# Patient Record
Sex: FEMALE | Race: WHITE | NOT HISPANIC OR LATINO | URBAN - METROPOLITAN AREA
[De-identification: names, ages, dates, MRNs, and addresses within clinical notes are randomized per-mention and may not be internally consistent; named-entity substitution may affect disease eponyms.]

---

## 2020-01-28 PROBLEM — 428283002 HISTORY OF POLYP OF COLON (SITUATION): Status: ACTIVE | Noted: 2020-01-28

## 2020-07-22 ENCOUNTER — TELEPHONE ENCOUNTER (OUTPATIENT)
Dept: URBAN - METROPOLITAN AREA CLINIC 35 | Facility: CLINIC | Age: 65
End: 2020-07-22

## 2022-04-26 ENCOUNTER — OFFICE VISIT (OUTPATIENT)
Dept: URBAN - METROPOLITAN AREA CLINIC 33 | Facility: CLINIC | Age: 67
End: 2022-04-26
Payer: MEDICARE

## 2022-04-26 ENCOUNTER — TELEPHONE ENCOUNTER (OUTPATIENT)
Dept: URBAN - METROPOLITAN AREA CLINIC 35 | Facility: CLINIC | Age: 67
End: 2022-04-26

## 2022-04-26 VITALS
WEIGHT: 134.8 LBS | HEIGHT: 63 IN | SYSTOLIC BLOOD PRESSURE: 118 MMHG | OXYGEN SATURATION: 96 % | BODY MASS INDEX: 23.88 KG/M2 | HEART RATE: 97 BPM | DIASTOLIC BLOOD PRESSURE: 76 MMHG

## 2022-04-26 DIAGNOSIS — Z80.0 FAMILY HISTORY OF COLON CANCER: ICD-10-CM

## 2022-04-26 DIAGNOSIS — R14.3 FLATULENCE SYMPTOM: ICD-10-CM

## 2022-04-26 DIAGNOSIS — Z12.11 COLON CANCER SCREENING: ICD-10-CM

## 2022-04-26 PROCEDURE — 99214 OFFICE O/P EST MOD 30 MIN: CPT | Performed by: PHYSICIAN ASSISTANT

## 2022-04-26 RX ORDER — TRAZODONE HYDROCHLORIDE 100 MG/1
1 TABLET AT BEDTIME TABLET, FILM COATED ORAL ONCE A DAY
Qty: 30 | Status: ON HOLD | COMMUNITY

## 2022-04-26 RX ORDER — B-COMPLEX WITH VITAMIN C
AS DIRECTED TABLET ORAL
Status: ON HOLD | COMMUNITY

## 2022-04-26 RX ORDER — CALCIUM CITRATE/VITAMIN D3 315MG-6.25
1 TABLET TABLET ORAL TWICE A DAY
Qty: 60 | Status: ON HOLD | COMMUNITY

## 2022-04-26 RX ORDER — BRIVARACETAM 100 MG/1
1 TABLET TABLET, FILM COATED ORAL TWICE A DAY
Status: ACTIVE | COMMUNITY

## 2022-04-26 RX ORDER — CALCIUM CARBONATE 500(1250)
1 TABLET WITH MEALS TABLET ORAL TWICE A DAY
Status: ACTIVE | COMMUNITY

## 2022-04-26 RX ORDER — SODIUM PICOSULFATE, MAGNESIUM OXIDE, AND ANHYDROUS CITRIC ACID 10; 3.5; 12 MG/160ML; G/160ML; G/160ML
AS DIRECTED LIQUID ORAL
Qty: 1 KIT | Refills: 0 | Status: ON HOLD | COMMUNITY
Start: 2020-01-28

## 2022-04-26 RX ORDER — LETROZOLE 2.5 MG/1
1 TABLET TABLET, FILM COATED ORAL ONCE A DAY
Status: ACTIVE | COMMUNITY

## 2022-04-26 RX ORDER — LEVOTHYROXINE SODIUM 50 UG/1
1 TABLET IN THE MORNING ON AN EMPTY STOMACH TABLET ORAL ONCE A DAY
Status: ACTIVE | COMMUNITY

## 2022-04-26 RX ORDER — SODIUM PICOSULFATE, MAGNESIUM OXIDE, AND ANHYDROUS CITRIC ACID 10; 3.5; 12 MG/160ML; G/160ML; G/160ML
160 ML LIQUID ORAL
Qty: 320 MILLILITER | Refills: 0 | OUTPATIENT
Start: 2022-04-26 | End: 2022-04-27

## 2022-04-26 NOTE — PHYSICAL EXAM GASTROINTESTINAL
Abdomen , soft, nontender, nondistended , no guarding or rigidity , no masses palpable , normal bowel sounds , Liver and Spleen , no hepatomegaly present , no hepatosplenomegaly , liver nontender , spleen not palpable
18-Jan-2022

## 2022-04-26 NOTE — HPI-COLONOSCOPY SCREENING
Patient is a 66-year-old  female who presents today for consultation for a colonoscopy.  Her last colonoscopy was in 2014 with Dr. Santos. Maternal uncle was diagnosed with colon cancer in his 60's and passed away in his 80's.   Patient currently admits one bowel movement every day. Stools are more hard and formed.   Denies sleep apnea or any cardiac issues. Admits occasional NSAID use.  COLONOSCOPY 05/28/2014 - DR. Santos Rectum: A few polyps of size 3.0 mm. Excision biopsies taken. Bx revealed hyperplastic polyp x 2 (or fragments of the same) Transverse colon: polyp of size 3.0 mm - distal. Polypectomy done using cold biopsy forceps. Bx revealed benign colonic mucosa with histological features highly suggestive of, but no diagnostic for, a hyperplastic polyp. ;

## 2022-04-26 NOTE — HPI-GAS
She also admits having a lot of flatulence. She said it's a running joke in the family, and has been a longstanding issue.

## 2022-06-10 PROBLEM — 305058001: Status: ACTIVE | Noted: 2022-06-10

## 2022-07-11 ENCOUNTER — CLAIMS CREATED FROM THE CLAIM WINDOW (OUTPATIENT)
Dept: URBAN - METROPOLITAN AREA CLINIC 4 | Facility: CLINIC | Age: 67
End: 2022-07-11
Payer: MEDICARE

## 2022-07-11 ENCOUNTER — OFFICE VISIT (OUTPATIENT)
Dept: URBAN - METROPOLITAN AREA SURGERY CENTER 8 | Facility: SURGERY CENTER | Age: 67
End: 2022-07-11
Payer: MEDICARE

## 2022-07-11 DIAGNOSIS — D12.0 ADENOMA OF CECUM: ICD-10-CM

## 2022-07-11 DIAGNOSIS — D12.0 BENIGN NEOPLASM OF CECUM: ICD-10-CM

## 2022-07-11 DIAGNOSIS — Z12.11 COLON CANCER SCREENING: ICD-10-CM

## 2022-07-11 PROCEDURE — 45385 COLONOSCOPY W/LESION REMOVAL: CPT | Performed by: INTERNAL MEDICINE

## 2022-07-11 PROCEDURE — 88305 TISSUE EXAM BY PATHOLOGIST: CPT | Performed by: PATHOLOGY

## 2022-07-11 PROCEDURE — 45381 COLONOSCOPY SUBMUCOUS NJX: CPT | Performed by: INTERNAL MEDICINE

## 2022-07-11 PROCEDURE — G8907 PT DOC NO EVENTS ON DISCHARG: HCPCS | Performed by: INTERNAL MEDICINE

## 2022-07-26 ENCOUNTER — CLAIMS CREATED FROM THE CLAIM WINDOW (OUTPATIENT)
Dept: URBAN - METROPOLITAN AREA CLINIC 33 | Facility: CLINIC | Age: 67
End: 2022-07-26
Payer: MEDICARE

## 2022-07-26 ENCOUNTER — DASHBOARD ENCOUNTERS (OUTPATIENT)
Age: 67
End: 2022-07-26

## 2022-07-26 VITALS — WEIGHT: 136 LBS | HEIGHT: 63 IN | BODY MASS INDEX: 24.1 KG/M2

## 2022-07-26 DIAGNOSIS — K57.90 DIVERTICULOSIS: ICD-10-CM

## 2022-07-26 DIAGNOSIS — K64.0 GRADE I HEMORRHOIDS: ICD-10-CM

## 2022-07-26 DIAGNOSIS — D12.0 ADENOMA OF CECUM: ICD-10-CM

## 2022-07-26 DIAGNOSIS — R14.3 FLATULENCE SYMPTOM: ICD-10-CM

## 2022-07-26 DIAGNOSIS — K57.30 ACQUIRED DIVERTICULOSIS OF COLON: ICD-10-CM

## 2022-07-26 PROBLEM — 397881000: Status: ACTIVE | Noted: 2022-07-26

## 2022-07-26 PROCEDURE — 99214 OFFICE O/P EST MOD 30 MIN: CPT | Performed by: PHYSICIAN ASSISTANT

## 2022-07-26 RX ORDER — SODIUM PICOSULFATE, MAGNESIUM OXIDE, AND ANHYDROUS CITRIC ACID 10; 3.5; 12 MG/160ML; G/160ML; G/160ML
AS DIRECTED LIQUID ORAL
Qty: 1 KIT | Refills: 0 | Status: DISCONTINUED | COMMUNITY
Start: 2020-01-28

## 2022-07-26 RX ORDER — BRIVARACETAM 100 MG/1
1 TABLET TABLET, FILM COATED ORAL TWICE A DAY
Status: ACTIVE | COMMUNITY

## 2022-07-26 RX ORDER — B-COMPLEX WITH VITAMIN C
AS DIRECTED TABLET ORAL
Status: ACTIVE | COMMUNITY

## 2022-07-26 RX ORDER — TRAZODONE HYDROCHLORIDE 100 MG/1
1 TABLET AT BEDTIME TABLET, FILM COATED ORAL ONCE A DAY
Qty: 30 | Status: ACTIVE | COMMUNITY

## 2022-07-26 RX ORDER — LETROZOLE 2.5 MG/1
1 TABLET TABLET, FILM COATED ORAL ONCE A DAY
Status: ACTIVE | COMMUNITY

## 2022-07-26 RX ORDER — CALCIUM CITRATE/VITAMIN D3 315MG-6.25
1 TABLET TABLET ORAL TWICE A DAY
Qty: 60 | Status: ACTIVE | COMMUNITY

## 2022-07-26 RX ORDER — LEVOTHYROXINE SODIUM 50 UG/1
1 TABLET IN THE MORNING ON AN EMPTY STOMACH TABLET ORAL ONCE A DAY
Status: ACTIVE | COMMUNITY

## 2022-07-26 RX ORDER — CALCIUM CARBONATE 500(1250)
1 TABLET WITH MEALS TABLET ORAL TWICE A DAY
Status: ACTIVE | COMMUNITY

## 2022-07-26 NOTE — HPI-GAS
Patient admits her gas has been about the same.  Last Visit (4/26/22) She also admits having a lot of flatulence. She said it's a running joke in the family, and has been a longstanding issue.

## 2022-07-26 NOTE — HPI-COLONOSCOPY FOLLOWUP
67 Year old female patient presents today for a follow up from her colonoscopy on 07/11/2022 with Dr. Santos. She denies any complications after her procedure.   Colonoscopy Findings (07/11/2022):   - The perianal and digital rectal examinations were normal. Pertinent negatives include normal sphincter tone, no palpable rectal lesions and normal prostate (size, shape, and consistency). - Non-bleeding internal hemorrhoids were found during retroflexion.  The hemorrhoids were small and Grade I (internal hemorrhoids that do not prolapse). - Multiple small-mouthed diverticula were found in the sigmoid colon and distal descending colon. - A 6 mm polyp was found in the proximal cecum.  The polyp was sessile.  The polyp was removed with a cold snare.  Resection and retrieval were complete.  Estimated blood loss was minimal. - A 20 mm polyp was found in the proximal cecum.  The polyp was flat and sessile.  The polyp was removed with a saline injection-lift technique using a hot snare.  Resection and retrieval were complete.  Estimated blood loss was minimal. - An 11 mm polyp was found in the distal cecum.  The polyp was semi-pedunculated.  The polyp was removed with a hot snare.  Resection and retrieval were complete.  Estimated blood loss was minimal. - The terminal ileum appeared normal.

## 2024-09-30 ENCOUNTER — LAB OUTSIDE AN ENCOUNTER (OUTPATIENT)
Dept: URBAN - METROPOLITAN AREA CLINIC 33 | Facility: CLINIC | Age: 69
End: 2024-09-30

## 2024-09-30 ENCOUNTER — OFFICE VISIT (OUTPATIENT)
Dept: URBAN - METROPOLITAN AREA CLINIC 33 | Facility: CLINIC | Age: 69
End: 2024-09-30
Payer: MEDICARE

## 2024-09-30 VITALS
SYSTOLIC BLOOD PRESSURE: 108 MMHG | BODY MASS INDEX: 21.44 KG/M2 | DIASTOLIC BLOOD PRESSURE: 66 MMHG | OXYGEN SATURATION: 96 % | WEIGHT: 121 LBS | HEART RATE: 85 BPM | HEIGHT: 63 IN

## 2024-09-30 DIAGNOSIS — K57.90 DIVERTICULOSIS: ICD-10-CM

## 2024-09-30 DIAGNOSIS — Z86.010 PERSONAL HISTORY OF COLONIC POLYPS: ICD-10-CM

## 2024-09-30 PROCEDURE — 99213 OFFICE O/P EST LOW 20 MIN: CPT | Performed by: INTERNAL MEDICINE

## 2024-09-30 RX ORDER — ANASTROZOLE 1 MG/1
1 TABLET TABLET, FILM COATED ORAL ONCE A DAY
Status: ACTIVE | COMMUNITY

## 2024-09-30 RX ORDER — CALCIUM CARBONATE 500(1250)
1 TABLET WITH MEALS TABLET ORAL TWICE A DAY
Status: ACTIVE | COMMUNITY

## 2024-09-30 RX ORDER — SODIUM, POTASSIUM,MAG SULFATES 17.5-3.13G
AS DIRECTED SOLUTION, RECONSTITUTED, ORAL ORAL
Qty: 1 | Refills: 0 | OUTPATIENT
Start: 2024-09-30 | End: 2024-10-02

## 2024-09-30 RX ORDER — B-COMPLEX WITH VITAMIN C
AS DIRECTED TABLET ORAL
Status: ON HOLD | COMMUNITY

## 2024-09-30 RX ORDER — BRIVARACETAM 100 MG/1
1 TABLET TABLET, FILM COATED ORAL TWICE A DAY
Status: ACTIVE | COMMUNITY

## 2024-09-30 RX ORDER — LEVOTHYROXINE SODIUM 75 UG/1
1 TABLET IN THE MORNING ON AN EMPTY STOMACH CAPSULE ORAL ONCE A DAY
Status: ACTIVE | COMMUNITY

## 2024-09-30 RX ORDER — TRAZODONE HYDROCHLORIDE 100 MG/1
1 TABLET AT BEDTIME TABLET ORAL ONCE A DAY
Qty: 30 | Status: ON HOLD | COMMUNITY

## 2024-09-30 RX ORDER — ACETYLCYSTEINE 600 MG
AS DIRECTED CAPSULE ORAL
Status: ACTIVE | COMMUNITY

## 2024-09-30 RX ORDER — CALCIUM CITRATE/VITAMIN D3 315MG-6.25
1 TABLET TABLET ORAL TWICE A DAY
Qty: 60 | Status: ACTIVE | COMMUNITY

## 2024-09-30 NOTE — HPI-SURVEILLANCE COLONOSCOPY
Patient presents today for surveillance colonoscopy consult. Patient states last colonoscopy was two years ago (2022) with findings of colon polyps. Patient admits a personal history of colon polyps. Patient denies a family history of colon polyps or colon cancer. Patient currently admits normal bowel habits with 2 bowel movements a day. Patient denies any current symptoms of rectal bleeding, melena or mucus.  OUTSIDE LABS  (09/25/2024) CMP - all values within normal limits  (05/31/2024) CMP Glucose - 62 L Total Protein - 5.9 L All other values within normal limits Lipid Panel - all values within normal limits CBC - all values within normal limits

## 2024-09-30 NOTE — HPI-GAS
Patient presents for follow up. Admits some improvement. Admits maintaining a high-fiber diet, limiting red meat for the past week and denies taking Gas-x as needed.  Last visit (7/26/22) Patient admits her gas has been about the same.  Last Visit (4/26/22) She also admits having a lot of flatulence. She said it's a running joke in the family, and has been a longstanding issue.

## 2024-10-24 ENCOUNTER — OFFICE VISIT (OUTPATIENT)
Dept: URBAN - METROPOLITAN AREA SURGERY CENTER 8 | Facility: SURGERY CENTER | Age: 69
End: 2024-10-24

## 2024-10-24 ENCOUNTER — TELEPHONE ENCOUNTER (OUTPATIENT)
Dept: URBAN - METROPOLITAN AREA CLINIC 35 | Facility: CLINIC | Age: 69
End: 2024-10-24

## 2024-10-24 ENCOUNTER — CLAIMS CREATED FROM THE CLAIM WINDOW (OUTPATIENT)
Dept: URBAN - METROPOLITAN AREA CLINIC 4 | Facility: CLINIC | Age: 69
End: 2024-10-24
Payer: MEDICARE

## 2024-10-24 ENCOUNTER — CLAIMS CREATED FROM THE CLAIM WINDOW (OUTPATIENT)
Dept: URBAN - METROPOLITAN AREA SURGERY CENTER 8 | Facility: SURGERY CENTER | Age: 69
End: 2024-10-24
Payer: MEDICARE

## 2024-10-24 DIAGNOSIS — Z86.0100 PERSONAL HISTORY OF COLONIC POLYPS: ICD-10-CM

## 2024-10-24 DIAGNOSIS — D12.4 BENIGN NEOPLASM OF DESCENDING COLON: ICD-10-CM

## 2024-10-24 DIAGNOSIS — Z09 ENCNTR FOR F/U EXAM AFT TRTMT FOR COND OTH THAN MALIG NEOPLM: ICD-10-CM

## 2024-10-24 DIAGNOSIS — K57.30 DIVERTCULOSIS OF LG INT W/O PERFORATION OR ABSCESS W/O BLEEDING: ICD-10-CM

## 2024-10-24 PROCEDURE — 88305 TISSUE EXAM BY PATHOLOGIST: CPT | Performed by: PATHOLOGY

## 2024-10-24 PROCEDURE — 00811 ANES LWR INTST NDSC NOS: CPT | Performed by: NURSE ANESTHETIST, CERTIFIED REGISTERED

## 2024-10-24 RX ORDER — BRIVARACETAM 100 MG/1
1 TABLET TABLET, FILM COATED ORAL TWICE A DAY
Status: ACTIVE | COMMUNITY

## 2024-10-24 RX ORDER — TRAZODONE HYDROCHLORIDE 100 MG/1
1 TABLET AT BEDTIME TABLET ORAL ONCE A DAY
Qty: 30 | Status: ON HOLD | COMMUNITY

## 2024-10-24 RX ORDER — B-COMPLEX WITH VITAMIN C
AS DIRECTED TABLET ORAL
Status: ON HOLD | COMMUNITY

## 2024-10-24 RX ORDER — LEVOTHYROXINE SODIUM 75 UG/1
1 TABLET IN THE MORNING ON AN EMPTY STOMACH CAPSULE ORAL ONCE A DAY
Status: ACTIVE | COMMUNITY

## 2024-10-24 RX ORDER — ANASTROZOLE 1 MG/1
1 TABLET TABLET, FILM COATED ORAL ONCE A DAY
Status: ACTIVE | COMMUNITY

## 2024-10-24 RX ORDER — ACETYLCYSTEINE 600 MG
AS DIRECTED CAPSULE ORAL
Status: ACTIVE | COMMUNITY

## 2024-10-24 RX ORDER — CALCIUM CITRATE/VITAMIN D3 315MG-6.25
1 TABLET TABLET ORAL TWICE A DAY
Qty: 60 | Status: ACTIVE | COMMUNITY

## 2024-10-24 RX ORDER — CALCIUM CARBONATE 500(1250)
1 TABLET WITH MEALS TABLET ORAL TWICE A DAY
Status: ACTIVE | COMMUNITY

## 2024-11-04 ENCOUNTER — OFFICE VISIT (OUTPATIENT)
Dept: URBAN - METROPOLITAN AREA CLINIC 33 | Facility: CLINIC | Age: 69
End: 2024-11-04

## 2024-11-04 ENCOUNTER — TELEPHONE ENCOUNTER (OUTPATIENT)
Dept: URBAN - METROPOLITAN AREA CLINIC 35 | Facility: CLINIC | Age: 69
End: 2024-11-04

## 2024-11-04 NOTE — HPI-GAS
Of last visit (09/30/2024) Patient presents for follow up. Admits some improvement. Admits maintaining a high-fiber diet, limiting red meat for the past week and denies taking Gas-x as needed.  Last visit (7/26/22) Patient admits her gas has been about the same.  Last Visit (4/26/22) She also admits having a lot of flatulence. She said it's a running joke in the family, and has been a longstanding issue.

## 2024-11-04 NOTE — HPI-COLONOSCOPY FOLLOWUP
69 year old female patient presents today for colonoscopy follow up. She had colonoscopy completed on 10/24/2024, with results noted below. She admits/denies any complications after procedure. Currently admits normal bowel habits, with __ bowel movements a day. Denies rectal bleeding, melena, or mucus.  COLON REPORT  IMPRESSION: Diverticulosis in the sigmoid colon. One 4 mm polyp in the descending colon, removed with a cold snare. Resected and retrieved.  COLON PATH Colon, Descending, Polypectomy (Cold Snare): TUBULAR ADENOMA(S).

## 2024-11-06 ENCOUNTER — OFFICE VISIT (OUTPATIENT)
Dept: URBAN - METROPOLITAN AREA CLINIC 33 | Facility: CLINIC | Age: 69
End: 2024-11-06
Payer: MEDICARE

## 2024-11-06 VITALS
OXYGEN SATURATION: 96 % | WEIGHT: 122 LBS | SYSTOLIC BLOOD PRESSURE: 110 MMHG | DIASTOLIC BLOOD PRESSURE: 70 MMHG | BODY MASS INDEX: 21.62 KG/M2 | HEART RATE: 94 BPM | HEIGHT: 63 IN

## 2024-11-06 DIAGNOSIS — Z86.0100 PERSONAL HISTORY OF COLON POLYPS, UNSPECIFIED: ICD-10-CM

## 2024-11-06 DIAGNOSIS — K57.30 ACQUIRED DIVERTICULOSIS OF COLON: ICD-10-CM

## 2024-11-06 DIAGNOSIS — Z09 ENCOUNTER FOR FOLLOW-UP: ICD-10-CM

## 2024-11-06 PROBLEM — 428283002: Status: ACTIVE | Noted: 2024-11-06

## 2024-11-06 PROCEDURE — 99212 OFFICE O/P EST SF 10 MIN: CPT | Performed by: INTERNAL MEDICINE

## 2024-11-06 RX ORDER — TRAZODONE HYDROCHLORIDE 100 MG/1
1 TABLET AT BEDTIME TABLET ORAL ONCE A DAY
Qty: 30 | Status: ON HOLD | COMMUNITY

## 2024-11-06 RX ORDER — B-COMPLEX WITH VITAMIN C
AS DIRECTED TABLET ORAL
Status: ON HOLD | COMMUNITY

## 2024-11-06 RX ORDER — LEVOTHYROXINE SODIUM 75 UG/1
1 TABLET IN THE MORNING ON AN EMPTY STOMACH CAPSULE ORAL ONCE A DAY
Status: ACTIVE | COMMUNITY

## 2024-11-06 RX ORDER — CALCIUM CARBONATE 500(1250)
1 TABLET WITH MEALS TABLET ORAL TWICE A DAY
Status: ACTIVE | COMMUNITY

## 2024-11-06 RX ORDER — ACETYLCYSTEINE 600 MG
AS DIRECTED CAPSULE ORAL
Status: ACTIVE | COMMUNITY

## 2024-11-06 RX ORDER — ANASTROZOLE 1 MG/1
1 TABLET TABLET, FILM COATED ORAL ONCE A DAY
Status: ACTIVE | COMMUNITY

## 2024-11-06 RX ORDER — BRIVARACETAM 100 MG/1
1 TABLET TABLET, FILM COATED ORAL TWICE A DAY
Status: ACTIVE | COMMUNITY

## 2024-11-06 RX ORDER — CALCIUM CITRATE/VITAMIN D3 315MG-6.25
1 TABLET TABLET ORAL TWICE A DAY
Qty: 60 | Status: ACTIVE | COMMUNITY

## 2024-11-06 NOTE — HPI-COLONOSCOPY FOLLOWUP
69 year old female patient presents today for colonoscopy follow up. She had colonoscopy completed on 10/24/2024, with results noted below. She denies any complications after procedure. Currently admits normal bowel habits, with 1-2 bowel movements a day. Denies rectal bleeding, melena, mucus.  COLON REPORT  IMPRESSION: Diverticulosis in the sigmoid colon. One 4 mm polyp in the descending colon, removed with a cold snare. Resected and retrieved.  COLON PATH Colon, Descending, Polypectomy (Cold Snare): TUBULAR ADENOMA(S).

## 2024-11-18 ENCOUNTER — OFFICE VISIT (OUTPATIENT)
Dept: URBAN - METROPOLITAN AREA CLINIC 33 | Facility: CLINIC | Age: 69
End: 2024-11-18